# Patient Record
(demographics unavailable — no encounter records)

---

## 2025-03-24 NOTE — ASSESSMENT
[FreeTextEntry1] : - will start pt on Medrol pack, Robaxin, and course of physical therapy. -  Diclofenac to be started after completion of medrol pack. -Due to the fact that he has had 5 months of symptoms with recent exacerbation and diminished sensation over the dorsal aspect of the right foot will get an MRI for evaluation of HNP Will follow-up after MRI for review and delineation of treatment plan

## 2025-03-24 NOTE — PHYSICAL EXAM
[Flexion] : flexion [Extension] : extension [Bending to left] : bending to left [Bending to right] : bending to right [] : mildly antalgic [FreeTextEntry9] : He is apprehensive and guarding on exam [de-identified] : He notes diminished sensation to light touch over the dorsal aspect of the right foot compared to the contralateral side
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
## 2025-03-24 NOTE — IMAGING
[Straightening consistent with spasm] : Straightening consistent with spasm [de-identified] : normal